# Patient Record
Sex: MALE | Race: OTHER | HISPANIC OR LATINO | URBAN - METROPOLITAN AREA
[De-identification: names, ages, dates, MRNs, and addresses within clinical notes are randomized per-mention and may not be internally consistent; named-entity substitution may affect disease eponyms.]

---

## 2020-03-29 ENCOUNTER — EMERGENCY (EMERGENCY)
Facility: HOSPITAL | Age: 45
LOS: 1 days | Discharge: HOME | End: 2020-03-29
Payer: MEDICAID

## 2020-03-29 VITALS
RESPIRATION RATE: 16 BRPM | DIASTOLIC BLOOD PRESSURE: 80 MMHG | HEART RATE: 96 BPM | SYSTOLIC BLOOD PRESSURE: 120 MMHG | OXYGEN SATURATION: 98 % | TEMPERATURE: 99 F

## 2020-03-29 VITALS
OXYGEN SATURATION: 98 % | DIASTOLIC BLOOD PRESSURE: 73 MMHG | TEMPERATURE: 100 F | RESPIRATION RATE: 18 BRPM | SYSTOLIC BLOOD PRESSURE: 111 MMHG | HEART RATE: 89 BPM

## 2020-03-29 PROCEDURE — 99284 EMERGENCY DEPT VISIT MOD MDM: CPT

## 2020-03-29 PROCEDURE — 99282 EMERGENCY DEPT VISIT SF MDM: CPT

## 2020-03-29 NOTE — ED ADULT NURSE NOTE - OBJECTIVE STATEMENT
pt is a 44 yr M presents with sore throat x weeks, pt reports body aches and dry cough x 1 week. girlfriend has similar symptoms, has asthma and has been using her nebulizer at home. pt last took tylenol yesterday. no acute distress.

## 2020-03-29 NOTE — ED PROVIDER NOTE - ADDITIONAL NOTES AND INSTRUCTIONS:
You are suspected to have a Covid infection and it is recommended that you self quarantine for the next 2 weeks unless cleared by your Primary Care Physician. For any questions call [phone]. - Quinton Michaud PA-C

## 2020-03-29 NOTE — ED PROVIDER NOTE - PHYSICAL EXAMINATION
General: Patient well-appearing    HEENT: MMM, posterior pharynx clear  Lungs: CTAB, non-tachypneic, no acute respiratory distress  Cardiovascular: S1S2, Non-tachycardic, No MRG  Neuro: Ambulates without difficulty. Moves all extremities FROM

## 2020-03-29 NOTE — ED PROVIDER NOTE - OBJECTIVE STATEMENT
44 year old male w Hx of Bronchitis 3-4 months ago which resolved reports that he has myalgias, throat discomfort and mild cough for the past 3-4 days. He lives with his girlfriend who has asthma and dyspnea and brought in to the ED today. He is an [employer]  and wants a note if he is suspected to have covid so he can properly quarantine.

## 2020-03-29 NOTE — ED PROVIDER NOTE - NSFOLLOWUPINSTRUCTIONS_ED_ALL_ED_FT
You were seen and evaluated for possible COVID 19 infection.   Testing was NOT performed, as we cannot perform definitive testing at this time for the novel coronavirus.  Please read the information packet provided to you carefully.    We think you are safe for discharge and would like you to follow up in a two to three days with your doctor by phone or in person.   You should treat fevers by taking Tylenol as directed as needed.  You should stay hydrated and increase the amount of fluid you drink.  YOU SHOULD SELF-QUARANTINE FOR 14 DAYS - PARTICULARLY AVOID THE ELDERLY, ILL AND IMMUNOCOMPROMISED - TO AVOID POTENTIAL SPREAD OF THE CORONAVIRUS.   You should monitor your temperatures, and return to the Emergency Department if your shortness of breath becomes worse, you become weak or new, concerning symptoms develop.     For outpatient coronavirus testing centers you may contact the following resources:  -[place] Novel Coronavirus 24/7 Hotline: (688)-554-3900  -[facility]. To locate you local center: [facility]  [facility]: [phone]  -[facility] through testing center    What is a coronavirus?  Coronaviruses are a large family of viruses that cause illnesses ranging from the common cold to more severe diseases such as Middle East Respiratory Syndrome (MERS) and Severe Acute Respiratory Syndrome (SARS).    What is Novel Coronavirus (COVID-19)?  The Centers for Disease Control and Prevention (CDC) is closely monitoring the outbreak caused by COVID-19. For the latest information about COVID-19, visit the CDC website at CDC.gov/Coronavirus    How are coronaviruses spread?  Coronaviruses can be transmitted from person to person, usually after close contact with an infected  person (for example, in a household, workplace, or healthcare setting), via droplets that become airborne after a cough or sneeze. These droplets can then infect a nearby person. Transmission can also occur by touching recently contaminated surfaces.    Is there a treatment for a COVID-19?  There is no specific treatment for disease caused by COVID-19. However, many of the symptoms can be treated based on the patient’s clinical condition. Supportive care for infected persons can be highly effective.    What are the symptoms of coronavirus infection?  It depends on the virus, but common signs include fever and/or respiratory symptoms such as cough and shortness of breath. In more severe cases, infection can cause pneumonia, severe acute respiratory syndrome, kidney failure and even death. Fortunately, most cases of COVID-19 have an illness no different than the influenza (flu), with a majority of these patients having mild symptoms and overall mortality which appears to be not much different than the flu.    What can I do to protect myself?  The best precautionary measures:  – washing your hands  – covering your cough  – disinfecting surfaces  – it is also advisable to avoid close contact with anyone showing symptoms of respiratory illness such as coughing and sneezing  – those with symptoms should wear a surgical mask when around others    What can I do to protect those around me?  If you have been identified as someone who may be infected with COVID-19, we recommend you follow the self-isolation procedures outlined on the following page to protect those around you and to limit the spread of this virus.    We recommend the below precautionary steps from now until 14 days from when you returned from your travel or date of your last known possible contact:  — Do not go to work, school or public areas. Avoid using public transportation, ridesharing or taxis.  — As much as possible, separate yourself from other people in your home. If you can, you should stay in a room and away from other people. Also, you should use a separate bathroom if available.  — Wear the supplied mask whenever you are around other people.  — If you have a non-urgent medical appointment, please reschedule for a later date. If the appointment is urgent, please call the health care provider and tell them that you are on self-isolation for possible COVID-19. This will help the health care provider’s office take steps to keep other people from getting infected or exposed. If you can reschedule routine appointments, do so.  — Wash your hands often with soap and water for at least 15 to 20 seconds or clean your hands with an alcohol-based hand  that contains 60 to 95% alcohol, covering all surfaces of your hands and rubbing them together until they feel dry. Soap and water should be used preferentially if hands are visibly dirty.  — Cover your mouth and nose with a tissue when you cough or sneeze. Throw used tissues in a lined trash can. Immediately wash your hands.  — Avoid touching your eyes, nose, and mouth with your hands.  — Avoid sharing personal household items. You should not share dishes, drinking glasses, cups, eating utensils, towels, or bedding with other people or pets in your home. After using these items, they should be washed thoroughly with soap and water.  — Clean and disinfect all “high-touch” surfaces every day. High touch surfaces include counters, tabletops, doorknobs, light switches, remote controls, bathroom fixtures, toilets, phones, keyboards, tablets, and bedside tables. Also, clean any surfaces that may have blood, stool, or body fluids on them.

## 2020-03-29 NOTE — ED PROVIDER NOTE - PATIENT PORTAL LINK FT
You can access the FollowMyHealth Patient Portal offered by [facility] by registering at the following website: [email or web address]. By joining [employer]’s FollowMyHealth portal, you will also be able to view your health information using other applications (apps) compatible with our system.

## 2020-03-29 NOTE — ED PROVIDER NOTE - CLINICAL SUMMARY MEDICAL DECISION MAKING FREE TEXT BOX
The patient does not have high-risk features of a life-threatening URI infection (COVID or otherwise). There is no severe shortness of breath, light-headedness, or inability to perform activities of daily living that would indicate impending respiratory failure. VS were without sig abnormality. Myself or the RN has had a long conversation with the patient regarding the reasons to return to the Emergency Department including but not limited to: worsening cough, shortness of breath, syncope, near-syncope, chest pain or any other concerns.  However, COVID/RVP swabs were sent given age and multiple risk factors, as well as high risk exposure to clusters or possible COVID-19 exposures.  Patient was counseled extensively on self-quarantine protocol, hand washing, covering cough, cleansing of regularly used surfaces, return precautions, and supportive care measures to be taken.

## 2020-03-29 NOTE — ED PROVIDER NOTE - NS ED ROS FT
GENERAL: +malaise. Denies fever  HEENT: No trouble swallowing or speaking, no change in vision.  CARDIAC: No chest pain, edema  PULMONARY: +cough, no shortness of breath  GI: No N/V/D  ROS as per HPI, or otherwise negative.

## 2020-04-04 ENCOUNTER — EMERGENCY (EMERGENCY)
Facility: HOSPITAL | Age: 45
LOS: 1 days | Discharge: HOME | End: 2020-04-04
Payer: MEDICAID

## 2020-04-04 VITALS
WEIGHT: 145.06 LBS | HEIGHT: 66 IN | RESPIRATION RATE: 20 BRPM | DIASTOLIC BLOOD PRESSURE: 74 MMHG | TEMPERATURE: 98 F | SYSTOLIC BLOOD PRESSURE: 158 MMHG | HEART RATE: 67 BPM | OXYGEN SATURATION: 96 %

## 2020-04-04 PROBLEM — J40 BRONCHITIS, NOT SPECIFIED AS ACUTE OR CHRONIC: Chronic | Status: ACTIVE | Noted: 2020-03-29

## 2020-04-04 LAB
ALBUMIN SERPL ELPH-MCNC: 4.5 G/DL — SIGNIFICANT CHANGE UP (ref 3.3–5)
ALP SERPL-CCNC: 74 U/L — SIGNIFICANT CHANGE UP (ref 40–120)
ALT FLD-CCNC: 46 U/L — HIGH (ref 10–45)
ANION GAP SERPL CALC-SCNC: 13 MMOL/L — SIGNIFICANT CHANGE UP (ref 5–17)
AST SERPL-CCNC: 30 U/L — SIGNIFICANT CHANGE UP (ref 10–40)
BASOPHILS # BLD AUTO: 0.02 K/UL — SIGNIFICANT CHANGE UP (ref 0–0.2)
BASOPHILS NFR BLD AUTO: 0.5 % — SIGNIFICANT CHANGE UP (ref 0–2)
BILIRUB SERPL-MCNC: 0.3 MG/DL — SIGNIFICANT CHANGE UP (ref 0.2–1.2)
BUN SERPL-MCNC: 11 MG/DL — SIGNIFICANT CHANGE UP (ref 7–23)
CALCIUM SERPL-MCNC: 9.5 MG/DL — SIGNIFICANT CHANGE UP (ref 8.4–10.5)
CHLORIDE SERPL-SCNC: 105 MMOL/L — SIGNIFICANT CHANGE UP (ref 96–108)
CO2 SERPL-SCNC: 22 MMOL/L — SIGNIFICANT CHANGE UP (ref 22–31)
CREAT SERPL-MCNC: 0.94 MG/DL — SIGNIFICANT CHANGE UP (ref 0.5–1.3)
EOSINOPHIL # BLD AUTO: 0.08 K/UL — SIGNIFICANT CHANGE UP (ref 0–0.5)
EOSINOPHIL NFR BLD AUTO: 1.9 % — SIGNIFICANT CHANGE UP (ref 0–6)
GLUCOSE SERPL-MCNC: 118 MG/DL — HIGH (ref 70–99)
HCT VFR BLD CALC: 45.4 % — SIGNIFICANT CHANGE UP (ref 39–50)
HGB BLD-MCNC: 14.6 G/DL — SIGNIFICANT CHANGE UP (ref 13–17)
IMM GRANULOCYTES NFR BLD AUTO: 0.2 % — SIGNIFICANT CHANGE UP (ref 0–1.5)
LYMPHOCYTES # BLD AUTO: 1.48 K/UL — SIGNIFICANT CHANGE UP (ref 1–3.3)
LYMPHOCYTES # BLD AUTO: 34.5 % — SIGNIFICANT CHANGE UP (ref 13–44)
MAGNESIUM SERPL-MCNC: 2.1 MG/DL — SIGNIFICANT CHANGE UP (ref 1.6–2.6)
MCHC RBC-ENTMCNC: 26.4 PG — LOW (ref 27–34)
MCHC RBC-ENTMCNC: 32.2 GM/DL — SIGNIFICANT CHANGE UP (ref 32–36)
MCV RBC AUTO: 81.9 FL — SIGNIFICANT CHANGE UP (ref 80–100)
MONOCYTES # BLD AUTO: 0.33 K/UL — SIGNIFICANT CHANGE UP (ref 0–0.9)
MONOCYTES NFR BLD AUTO: 7.7 % — SIGNIFICANT CHANGE UP (ref 2–14)
NEUTROPHILS # BLD AUTO: 2.37 K/UL — SIGNIFICANT CHANGE UP (ref 1.8–7.4)
NEUTROPHILS NFR BLD AUTO: 55.2 % — SIGNIFICANT CHANGE UP (ref 43–77)
NRBC # BLD: 0 /100 WBCS — SIGNIFICANT CHANGE UP (ref 0–0)
PHOSPHATE SERPL-MCNC: 3 MG/DL — SIGNIFICANT CHANGE UP (ref 2.5–4.5)
PLATELET # BLD AUTO: 196 K/UL — SIGNIFICANT CHANGE UP (ref 150–400)
POTASSIUM SERPL-MCNC: 4.2 MMOL/L — SIGNIFICANT CHANGE UP (ref 3.5–5.3)
POTASSIUM SERPL-SCNC: 4.2 MMOL/L — SIGNIFICANT CHANGE UP (ref 3.5–5.3)
PROT SERPL-MCNC: 7.6 G/DL — SIGNIFICANT CHANGE UP (ref 6–8.3)
RBC # BLD: 5.54 M/UL — SIGNIFICANT CHANGE UP (ref 4.2–5.8)
RBC # FLD: 14.4 % — SIGNIFICANT CHANGE UP (ref 10.3–14.5)
SODIUM SERPL-SCNC: 140 MMOL/L — SIGNIFICANT CHANGE UP (ref 135–145)
WBC # BLD: 4.29 K/UL — SIGNIFICANT CHANGE UP (ref 3.8–10.5)
WBC # FLD AUTO: 4.29 K/UL — SIGNIFICANT CHANGE UP (ref 3.8–10.5)

## 2020-04-04 PROCEDURE — 85027 COMPLETE CBC AUTOMATED: CPT

## 2020-04-04 PROCEDURE — 71045 X-RAY EXAM CHEST 1 VIEW: CPT | Mod: 26

## 2020-04-04 PROCEDURE — 99284 EMERGENCY DEPT VISIT MOD MDM: CPT

## 2020-04-04 PROCEDURE — 84100 ASSAY OF PHOSPHORUS: CPT

## 2020-04-04 PROCEDURE — 93005 ELECTROCARDIOGRAM TRACING: CPT

## 2020-04-04 PROCEDURE — 93010 ELECTROCARDIOGRAM REPORT: CPT

## 2020-04-04 PROCEDURE — 99283 EMERGENCY DEPT VISIT LOW MDM: CPT

## 2020-04-04 PROCEDURE — 80053 COMPREHEN METABOLIC PANEL: CPT

## 2020-04-04 PROCEDURE — 83735 ASSAY OF MAGNESIUM: CPT

## 2020-04-04 PROCEDURE — 71045 X-RAY EXAM CHEST 1 VIEW: CPT

## 2020-04-04 NOTE — ED PROVIDER NOTE - PATIENT PORTAL LINK FT
You can access the FollowMyHealth Patient Portal offered by [facility] by registering at the following website: [email or web address]. By joining [email or web address]’s FollowMyHealth portal, you will also be able to view your health information using other applications (apps) compatible with our system.

## 2020-04-04 NOTE — ED PROVIDER NOTE - OBJECTIVE STATEMENT
43yo m pmhx chronic bronchitis pw 4 days of malaise and generalized weakness. today had an energy solution and ate dinner and felt palpitations. Denies recent trauma, fevers, chills, headache, dizziness, nausea, vomiting, dysuria, freq, hematuria, diarrhea, constipation, chest pain, shortness of breath, cough. has felt sick for last 6 days and gf is admitted with covid to [facility]. otherwise sx have since resolved.

## 2020-04-04 NOTE — ED PROVIDER NOTE - PHYSICAL EXAMINATION
Physical Exam:  Gen: NAD, AOx3, non-toxic appearing  Head: normal appearing  HEENT: normal conjunctiva, oral mucosa moist  Lung:  no respiratory distress, speaking in full sentences, clear to ascultation bilaterally     CV: regular rate and rhythm   Abd: soft, ND, NT  MSK: no visible deformities  Neuro: No focal deficits  Skin: Warm  Psych: normal affect  ~Helio Fiore D.O. -Resident

## 2020-04-04 NOTE — ED ADULT NURSE NOTE - NSIMPLEMENTINTERV_GEN_ALL_ED
Implemented All Universal Safety Interventions:  [place] to call system. Call bell, personal items and telephone within reach. Instruct patient to call for assistance. Room bathroom lighting operational. Non-slip footwear when patient is off stretcher. Physically safe environment: no spills, clutter or unnecessary equipment. Stretcher in lowest position, wheels locked, appropriate side rails in place.

## 2020-04-04 NOTE — ED PROVIDER NOTE - NS ED ROS FT
ROS:  GENERAL: No fever, no chills  EYES: no change in vision  HEENT: no trouble swallowing, no trouble speaking  CARDIAC: no chest pain  PULMONARY: no cough, no shortness of breath  GI: no abdominal pain, no nausea, no vomiting, no diarrhea, no constipation  : No dysuria, no frequency, no change in appearance, or odor of urine  SKIN: no rashes  NEURO: no headache, + weakness  MSK: No joint pain  ~Helio Fiore D.O. -Resident

## 2020-04-04 NOTE — ED ADULT NURSE NOTE - OBJECTIVE STATEMENT
Patient is a 44y male presenting to the ED ambulatory from home with c/o SOB. Patient A&Ox4. Patient reports having worsening SOB accompanied by fever/chills, dry cough, dizziness and weakness. Reports having "palpitations" today after drinking an energy drink Patient is a 44y male presenting to the ED ambulatory from home with c/o SOB. Patient A&Ox4. Patient reports having worsening SOB accompanied by fever/chills, dry cough, dizziness and weakness. Reports having "palpitations" today after drinking an energy drink. Denies chest pain, N/V/D, abdominal pain, burning upon urination or difficulty urinating. States his girlfriend has COVID-19 and is currently being treated at [facility]. Lung sounds clear bilaterally. ECG performed at bedside showing NSR. Denies any pertinent medical history or daily medication use.

## 2020-04-04 NOTE — ED PROVIDER NOTE - ATTENDING CONTRIBUTION TO CARE
Agree with resident history, physical, plan.  44 male chronic bronchitis c/o 4 days of malaise/weakness generalized/palpitations. + sick contacts girlfriend in [place] for COVID +. Patient arrives Rm air 97% ambulating without labored respirations, speaking in full sentences, appears well. Physical exam with normal heart rate/rhytme, lungs CTA, abd soft, nontender. Will be discharged, f/u with PMD, precautions and self-isolation instructions given and understood.

## 2020-04-04 NOTE — ED ADULT TRIAGE NOTE - BMI (KG/M2)
[facility] sent Rx request for the following:      LISINOPRIL 20MG TABLETS  Sig: TAKE 1 TABLET(20 MG) BY MOUTH DAILY  Last Prescription Date:   2/28/18  Last Fill Qty/Refills:         90, R-3    Last Office Visit:              5/3/18 (Est. Care w/ [facility])  Future Office visit:           None.    Routing refill request to provider for review/approval because:  ACE Inhibitors (Including Combos) Protocol Failed3/5 3:56 PM   Normal serum creatinine on file in past 12 months    Normal serum potassium on file in past 12 months     Unable to complete prescription refill per RN Medication Refill Policy. Nellie Alonzo RN .............. 3/5/2019  4:01 PM    
23.4

## 2020-04-04 NOTE — ED PROVIDER NOTE - NSFOLLOWUPINSTRUCTIONS_ED_ALL_ED_FT
You were seen and evaluated for possible COVID 19 infection. Testing was NOT performed. We think you are safe for discharge and would like you to follow up in a two to three days with your doctor by phone or in person.   You should treat fevers by taking Tylenol as needed.    You should stay hydrated and increase the amount of fluid you drink.  Return to the Emergency Department if your shortness of breath becomes worse, you become weak, or new symptoms develop.    You should monitor your temperature and quarantine yourself away from others - particularly the elderly, ill, or immunosuppressed - for the next 14 days.    We recommend the below precautionary steps from now until 14 days from when you returned from your travel or date of your last known possible contact:  - Do not go to work, school, or public areas. Avoid using public transportation, ride-sharing, or taxis.  -Wear a mask whenever you are around other people.  -Avoid sharing personal household items. You should not share dishes, drinking glasses, cups, eating utensils, towels, or bedding with other people or pets in your home. After using these items, they should be washed thoroughly with soap and water.   - Avoid touching your eyes, nose, and mouth with your hands.  - Wash your hands often with soap and water for at least 15 to 20 seconds or clean your hands with an alcohol-based hand  that contains 60 to 95% alcohol, covering all surfaces of your hands and rubbing them together until they feel dry. Soap and water should be used preferentially if hands are visibly dirty.

## 2020-04-04 NOTE — ED PROVIDER NOTE - CLINICAL SUMMARY MEDICAL DECISION MAKING FREE TEXT BOX
43yo m pmhx chronic bronchitis pw 4 days of malaise and generalized weakness. today had an energy solution and ate dinner and felt palpitations. xxyo x pw concern for Influenza vs COVID-19 vs viral PNA vs. URI, less likely bacterial PNA or severe COVID-19 without severe sx of shortness of breath, abnormal vital signs, well appearing, exam old with mild viral sx at this time. educated patient about signs and symptoms to look out for. patient feels comfortable with plan and will self quarantine for 2 weeks from start of sx. given the time to ask questions and all questions were answered. advised to return to the emergency room for any concerns or worsening signs / symptoms. will continue po fluids for dehydration, Tylenol for fever. likely sx related 2/2 to virus will get cbccmp cxr ekg r/o metabolic vs arrhthymias at present

## 2020-10-02 ENCOUNTER — EMERGENCY (EMERGENCY)
Facility: HOSPITAL | Age: 45
LOS: 1 days | Discharge: HOME | End: 2020-10-02
Attending: EMERGENCY MEDICINE
Payer: MEDICAID

## 2020-10-02 VITALS
RESPIRATION RATE: 18 BRPM | OXYGEN SATURATION: 96 % | DIASTOLIC BLOOD PRESSURE: 89 MMHG | HEART RATE: 66 BPM | SYSTOLIC BLOOD PRESSURE: 128 MMHG | TEMPERATURE: 98 F | HEIGHT: 66 IN | WEIGHT: 160.06 LBS

## 2020-10-02 VITALS
TEMPERATURE: 98 F | SYSTOLIC BLOOD PRESSURE: 133 MMHG | RESPIRATION RATE: 18 BRPM | DIASTOLIC BLOOD PRESSURE: 86 MMHG | OXYGEN SATURATION: 99 % | HEART RATE: 61 BPM

## 2020-10-02 PROCEDURE — 99283 EMERGENCY DEPT VISIT LOW MDM: CPT

## 2020-10-02 PROCEDURE — 73630 X-RAY EXAM OF FOOT: CPT

## 2020-10-02 PROCEDURE — 73630 X-RAY EXAM OF FOOT: CPT | Mod: 26,RT

## 2020-10-02 NOTE — ED PROVIDER NOTE - PROGRESS NOTE DETAILS
Attending MD Correia: XR no acute pathology, will give hard sole shoe for comfort, will give podiatry follow up.  Stable for discharge. Follow up instructions given, importance of follow up emphasized, return to ED parameters reviewed and patient verbalized understanding.  All questions answered, all concerns addressed.

## 2020-10-02 NOTE — ED ADULT NURSE NOTE - MODE OF DISCHARGE
Result Notes for EC-ECHOCARDIOGRAM LTD W/O CONT     Notes recorded by Nicci Dowell M.D. on 2/5/2020 at 4:40 PM PST  Reviewed echocardiogram. EF has improved.  No change in management at this time.   Thank you   AA         Letter drafted and mailed to pt  
Ambulatory

## 2020-10-02 NOTE — ED PROVIDER NOTE - ATTENDING CONTRIBUTION TO CARE
Attending MD Correia: I personally have seen and examined this patient.  Resident note reviewed and agree on plan of care and except where noted.  See HPI for details.

## 2020-10-02 NOTE — ED PROVIDER NOTE - NSFOLLOWUPINSTRUCTIONS_ED_ALL_ED_FT
You were seen in the Emergency Department for your RIGHT foot pain.  You had an Xray which did not show any acute fracture.  You are being given a hard sole shoe for comfort.  You may return to follow up with podiatry or your primary care physician.  Return to the ED for new or worsening symptoms.  You may take over the counter pain medication as per package instructions for pain. You were seen in the Emergency Department for your RIGHT foot pain.  You had an Xray which did not show any acute fracture.  You are being given a hard sole shoe for comfort.  You may return to follow up with podiatry or your primary care physician.  Return to the ED for new or worsening symptoms. Please continue taking your regular medications as prescribed. For pain you may take over the counter antiinflammatory medications such as ibuprofen or naproxen as instructed on the medication bottle/packaging. Please read precautions in packaging before taking.

## 2020-10-02 NOTE — ED PROVIDER NOTE - OBJECTIVE STATEMENT
Attending MD Correia:     Seen in [place] 9    45M with no reported PMH/PSH/Meds/Allergies presents to the ED with R foot pain.  Report two weeks ago dropped a shopping cart on his foot.  Reports recently started a job with [employer] and walks 12-14 hours a day and is concerned that the pain has persisted two weeks out from incident.  Reports is able to ambulate but has pain at foot, indicates R midfoot.  Denies fall. Denies fevers, chills, dizziness, weakness. Denies other complaints.  Denies chest pain, shortness of breath, abdominal pain, nausea, vomiting, diarrhea.  Denies urinary complaints. A ten (10) point review of systems was negative other than as stated in the HPI or elsewhere in the chart.     Exam:   General: NAD  HENT: head NCAT, airway patent  Eyes: PERRL  Lungs: lungs CTAB with good inspiratory effort, no wheezing, no rhonchi, no rales  Cardiac: +S1S2, no m/r/g  GI: abdomen soft with +BS, NT, ND  : no CVAT  MSK: FROM at neck, no calf tenderness, swelling, erythema or warmth, +mild tenderness to the dorsal R midfoot, no ecchymosis, no erythema, no edema, cap refill <2s  Neuro: moving all extremities with 5/5 strength bilateral upper and lower extremities, good and equal  strength bilaterally, sensory grossly intact, no gross neuro deficits  Psych: normal mood and affect     A/P: 45M with R foot pain, will send for XR to rule out bony injury

## 2020-10-02 NOTE — ED ADULT NURSE NOTE - OBJECTIVE STATEMENT
46 y/o male denies PMH presents to ED reporting pain to R foot. 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 Pt reports walking around for working and reports pain to R foot. On exam, AOx3, speaking in complete sentences. Unlabored, spontaneous respirations, NAD. Abdomen soft, non-tender, non-distended. +2 peripheral pulses, capillary refill less than 2 seconds. Pt denies CP, SOB, n/v/d, fever/chills at this time. Awaiting evaluation by MD.

## 2020-10-02 NOTE — ED PROVIDER NOTE - PATIENT PORTAL LINK FT
You can access the FollowMyHealth Patient Portal offered by [facility] by registering at the following website: [email or web address]. By joining [employer]’s FollowMyHealth portal, you will also be able to view your health information using other applications (apps) compatible with our system.

## 2020-10-02 NOTE — ED PROVIDER NOTE - NSFOLLOWUPCLINICS_GEN_ALL_ED_FT
[facility]  Podiatry  [address] [place] [address] - 3rd [place] [address]  Phone: [phone]  Fax:   Follow Up Time: 1-3 Days

## 2020-10-02 NOTE — ED PROVIDER NOTE - CLINICAL SUMMARY MEDICAL DECISION MAKING FREE TEXT BOX
44 yo M w/ no pertinent PMH presenting s/p right foot injury 2 wks ago. VSS. Pain in right midfoot on exam. Concern for fracture. XR. Will reassess.

## 2020-10-02 NOTE — ED ADULT NURSE NOTE - NSIMPLEMENTINTERV_GEN_ALL_ED
Implemented All Universal Safety Interventions:  [place] to call system. Call bell, personal items and telephone within reach. Instruct patient to call for assistance. Room bathroom lighting operational. Non-slip footwear when patient is off stretcher. Physically safe environment: no spills, clutter or unnecessary equipment. Stretcher in lowest position, wheels locked, appropriate side rails in place.

## 2021-12-02 NOTE — ED ADULT TRIAGE NOTE - ACCOMPANIED BY
Patient Education     Arthralgia    Arthralgia is the term for pain in or around the joint. It is a symptom, not a disease. This pain may involve one or more joints. In some cases, the pain moves from joint to joint.  There are many causes for joint pain. These include:  · Injury  · Wearing out the joint surface (osteoarthritis)  · Inflammation of the joint because of crystals in the joint fluid (gout)  · Infection inside the joint    · Inflammation of the fluid-filled sacs around the joint (bursitis)  · Autoimmune disorders such as rheumatoid arthritis or lupus  · Inflammation of chords that attach muscle to bone (tendonitis)  Home care  · Rest the involved joint(s) until your symptoms improve.   · Eat a healthy diet, exercise as advised by your healthcare provider and stay at a healthy weight  · You may be prescribed pain medicine. If none is prescribed, you may use acetaminophen or ibuprofen to control pain and inflammation.    Follow-up care  Follow up with your healthcare provider or as advised.  When to seek medical advice  Call your healthcare provider right away if any of the following occurs:  · Pain, swelling, or redness of joint increases  · Pain worsens or recurs after a period of improvement  · Pain moves to other joints  · You cannot bear weight on the affected joint   · You cannot move the affected joint  · Joint appears deformed  · New rash appears  · Fever of 100.4ºF (38ºC) or higher, or as directed by your healthcare provider  · New symptoms appear  Raj last reviewed this educational content on 8/1/2019  © 1491-7686 [employer]. All rights reserved. This information is not intended as a substitute for professional medical care. Always follow your healthcare professional's instructions.           Patient Education     ACE Wrap  Minor muscle or joint injuries are often treated with an elastic bandage. The bandage provides support and compression to the injured area. An elastic  bandage is a stretchy, rolled bandage. Elastic bandages range in width from 2 to 6 inches. They can be used for a variety of injuries. The bandages are often called ACE bandages, after the most common brand name.  If used correctly, elastic bandages help control swelling and ease pain. An elastic bandage is also a good reminder not to overuse the injured area. However, elastic bandages do not provide a lot of support and will not prevent reinjury.  Home care    To apply an elastic bandage:  · Check the skin before wrapping the injury. It should be clean, dry, and free of drainage.  · Start wrapping below the injury and work your way toward the body. For an ankle sprain, start wrapping around the foot and work up toward the calf. This will help control swelling.  · Overlap the edges of the bandage so it stays snuggly in place.  · Wrap the bandage firmly, but not too tightly. A tight bandage can increase swelling on either end of the bandage. Make sure the bandage is wrinkle free.  · Leave fingers and toes exposed.  · Secure ends of the bandage (even self-sticking ones) with clips or tape.  · Check often to be sure there is good circulation, especially in the fingers and toes. Loosen the bandage if there is local swelling, numbness, tingling, discomfort, coldness, or discoloration (skin pale or bluish in color).  · Rewrap the bandage as needed during the day. Reroll the bandage as you unwind it.  Continue using the elastic bandage until the pain and swelling are gone or as your healthcare provider advises.  If you have been told to ice the area, the ice can be secured in place with the elastic bandage. Wrap the ice pack with a thin towel to protect the skin. Don't put ice or an ice pack directly on the skin.  Ice the area for no more than 20 minutes at a time.    Follow-up care  Follow up with your healthcare provider, or as advised.  When to seek medical advice  Call your healthcare provider for any of the  following:  · Pain and swelling that doesn't get better or gets worse  · Trouble moving injured area  · Skin discoloration, numbness, or tingling that doesn’t go away after bandage is removed  Raj last reviewed this educational content on 5/1/2018  © 2351-4230 [employer]. All rights reserved. This information is not intended as a substitute for professional medical care. Always follow your healthcare professional's instructions.            Self